# Patient Record
Sex: MALE | Race: BLACK OR AFRICAN AMERICAN | URBAN - METROPOLITAN AREA
[De-identification: names, ages, dates, MRNs, and addresses within clinical notes are randomized per-mention and may not be internally consistent; named-entity substitution may affect disease eponyms.]

---

## 2023-10-07 ENCOUNTER — HOSPITAL ENCOUNTER (EMERGENCY)
Age: 43
Discharge: HOME | End: 2023-10-07
Attending: EMERGENCY MEDICINE

## 2023-10-07 VITALS
WEIGHT: 170 LBS | OXYGEN SATURATION: 98 % | SYSTOLIC BLOOD PRESSURE: 135 MMHG | HEIGHT: 73 IN | HEART RATE: 68 BPM | TEMPERATURE: 98.2 F | DIASTOLIC BLOOD PRESSURE: 88 MMHG | RESPIRATION RATE: 15 BRPM | BODY MASS INDEX: 22.53 KG/M2

## 2023-10-07 DIAGNOSIS — L72.9 BENIGN SKIN CYST: Primary | ICD-10-CM

## 2023-10-07 PROCEDURE — 10160 PNXR ASPIR ABSC HMTMA BULLA: CPT

## 2023-10-07 PROCEDURE — 10060 I&D ABSCESS SIMPLE/SINGLE: CPT

## 2023-10-07 PROCEDURE — 99283 EMERGENCY DEPT VISIT LOW MDM: CPT

## 2023-10-07 RX ORDER — DOXYCYCLINE HYCLATE 100 MG
100 TABLET ORAL 2 TIMES DAILY
Qty: 14 TABLET | Refills: 0 | Status: SHIPPED | OUTPATIENT
Start: 2023-10-07 | End: 2023-10-14

## 2023-10-07 ASSESSMENT — LIFESTYLE VARIABLES
HOW OFTEN DO YOU HAVE A DRINK CONTAINING ALCOHOL: 2-4 TIMES A MONTH
HOW MANY STANDARD DRINKS CONTAINING ALCOHOL DO YOU HAVE ON A TYPICAL DAY: 1 OR 2

## 2023-10-07 NOTE — ED TRIAGE NOTES
Patient a 40 y/o Male reports to the ED with c/c of a knot to the right side of his head. State she hit his head on a steel bar 2 months ago and has a knot ever since. Would like to get checked out to see what it is.

## 2023-10-07 NOTE — DISCHARGE INSTRUCTIONS
Take medication as prescribed. Follow up with your doctor. We would love to help you get a primary care doctor for follow-up after your emergency department visit. Please call [phone] between 7AM - 6PM Monday to Friday. A care navigator will be able to assist you with setting up a doctor close to your home.